# Patient Record
Sex: MALE | Race: WHITE | NOT HISPANIC OR LATINO | Employment: FULL TIME
[De-identification: names, ages, dates, MRNs, and addresses within clinical notes are randomized per-mention and may not be internally consistent; named-entity substitution may affect disease eponyms.]

---

## 2020-03-01 ENCOUNTER — HEALTH MAINTENANCE LETTER (OUTPATIENT)
Age: 45
End: 2020-03-01

## 2020-12-14 ENCOUNTER — HEALTH MAINTENANCE LETTER (OUTPATIENT)
Age: 45
End: 2020-12-14

## 2021-01-22 ENCOUNTER — HOSPITAL ENCOUNTER (EMERGENCY)
Facility: CLINIC | Age: 46
Discharge: HOME | End: 2021-01-22
Attending: PHYSICIAN ASSISTANT | Admitting: PHYSICIAN ASSISTANT
Payer: COMMERCIAL

## 2021-01-22 ENCOUNTER — APPOINTMENT (OUTPATIENT)
Dept: CT IMAGING | Facility: CLINIC | Age: 46
End: 2021-01-22
Attending: PHYSICIAN ASSISTANT
Payer: COMMERCIAL

## 2021-01-22 VITALS
DIASTOLIC BLOOD PRESSURE: 85 MMHG | BODY MASS INDEX: 25.07 KG/M2 | WEIGHT: 190 LBS | RESPIRATION RATE: 18 BRPM | HEART RATE: 78 BPM | SYSTOLIC BLOOD PRESSURE: 137 MMHG | OXYGEN SATURATION: 100 % | TEMPERATURE: 97.4 F

## 2021-01-22 DIAGNOSIS — N20.1 URETEROLITHIASIS: ICD-10-CM

## 2021-01-22 LAB
ALBUMIN UR-MCNC: 30 MG/DL
ANION GAP SERPL CALCULATED.3IONS-SCNC: 4 MMOL/L (ref 3–14)
APPEARANCE UR: CLEAR
BILIRUB UR QL STRIP: NEGATIVE
BUN SERPL-MCNC: 15 MG/DL (ref 7–30)
CALCIUM SERPL-MCNC: 9.5 MG/DL (ref 8.5–10.1)
CAOX CRY #/AREA URNS HPF: ABNORMAL /HPF
CHLORIDE SERPL-SCNC: 105 MMOL/L (ref 94–109)
CO2 SERPL-SCNC: 29 MMOL/L (ref 20–32)
COLOR UR AUTO: YELLOW
CREAT SERPL-MCNC: 0.96 MG/DL (ref 0.66–1.25)
GFR SERPL CREATININE-BSD FRML MDRD: >90 ML/MIN/{1.73_M2}
GLUCOSE SERPL-MCNC: 140 MG/DL (ref 70–99)
GLUCOSE UR STRIP-MCNC: NEGATIVE MG/DL
HGB UR QL STRIP: ABNORMAL
KETONES UR STRIP-MCNC: 10 MG/DL
LEUKOCYTE ESTERASE UR QL STRIP: NEGATIVE
MUCOUS THREADS #/AREA URNS LPF: PRESENT /LPF
NITRATE UR QL: NEGATIVE
PH UR STRIP: 5 PH (ref 5–7)
POTASSIUM SERPL-SCNC: 3.7 MMOL/L (ref 3.4–5.3)
RBC #/AREA URNS AUTO: 76 /HPF (ref 0–2)
SODIUM SERPL-SCNC: 138 MMOL/L (ref 133–144)
SOURCE: ABNORMAL
SP GR UR STRIP: 1.03 (ref 1–1.03)
UROBILINOGEN UR STRIP-MCNC: 2 MG/DL (ref 0–2)
WBC #/AREA URNS AUTO: 0 /HPF (ref 0–5)

## 2021-01-22 PROCEDURE — 96374 THER/PROPH/DIAG INJ IV PUSH: CPT

## 2021-01-22 PROCEDURE — 250N000011 HC RX IP 250 OP 636: Performed by: PHYSICIAN ASSISTANT

## 2021-01-22 PROCEDURE — 96376 TX/PRO/DX INJ SAME DRUG ADON: CPT

## 2021-01-22 PROCEDURE — 81001 URINALYSIS AUTO W/SCOPE: CPT | Performed by: PHYSICIAN ASSISTANT

## 2021-01-22 PROCEDURE — 80048 BASIC METABOLIC PNL TOTAL CA: CPT | Performed by: PHYSICIAN ASSISTANT

## 2021-01-22 PROCEDURE — 99285 EMERGENCY DEPT VISIT HI MDM: CPT | Mod: 25

## 2021-01-22 PROCEDURE — 96361 HYDRATE IV INFUSION ADD-ON: CPT

## 2021-01-22 PROCEDURE — 96375 TX/PRO/DX INJ NEW DRUG ADDON: CPT

## 2021-01-22 PROCEDURE — 258N000003 HC RX IP 258 OP 636: Performed by: PHYSICIAN ASSISTANT

## 2021-01-22 PROCEDURE — 74176 CT ABD & PELVIS W/O CONTRAST: CPT

## 2021-01-22 RX ORDER — HYDROMORPHONE HYDROCHLORIDE 1 MG/ML
1 INJECTION, SOLUTION INTRAMUSCULAR; INTRAVENOUS; SUBCUTANEOUS ONCE
Status: COMPLETED | OUTPATIENT
Start: 2021-01-22 | End: 2021-01-22

## 2021-01-22 RX ORDER — HYDROCODONE BITARTRATE AND ACETAMINOPHEN 5; 325 MG/1; MG/1
1 TABLET ORAL EVERY 6 HOURS PRN
Qty: 8 TABLET | Refills: 0 | Status: SHIPPED | OUTPATIENT
Start: 2021-01-22 | End: 2021-01-25

## 2021-01-22 RX ORDER — ONDANSETRON 2 MG/ML
4 INJECTION INTRAMUSCULAR; INTRAVENOUS ONCE
Status: COMPLETED | OUTPATIENT
Start: 2021-01-22 | End: 2021-01-22

## 2021-01-22 RX ORDER — ONDANSETRON 4 MG/1
4 TABLET, ORALLY DISINTEGRATING ORAL EVERY 8 HOURS PRN
Qty: 10 TABLET | Refills: 0 | Status: SHIPPED | OUTPATIENT
Start: 2021-01-22 | End: 2023-01-31

## 2021-01-22 RX ORDER — TAMSULOSIN HYDROCHLORIDE 0.4 MG/1
0.4 CAPSULE ORAL DAILY
Qty: 10 CAPSULE | Refills: 0 | Status: SHIPPED | OUTPATIENT
Start: 2021-01-22 | End: 2021-02-01

## 2021-01-22 RX ORDER — KETOROLAC TROMETHAMINE 15 MG/ML
15 INJECTION, SOLUTION INTRAMUSCULAR; INTRAVENOUS ONCE
Status: COMPLETED | OUTPATIENT
Start: 2021-01-22 | End: 2021-01-22

## 2021-01-22 RX ORDER — HYDROMORPHONE HYDROCHLORIDE 1 MG/ML
0.5 INJECTION, SOLUTION INTRAMUSCULAR; INTRAVENOUS; SUBCUTANEOUS ONCE
Status: COMPLETED | OUTPATIENT
Start: 2021-01-22 | End: 2021-01-22

## 2021-01-22 RX ADMIN — HYDROMORPHONE HYDROCHLORIDE 0.5 MG: 1 INJECTION, SOLUTION INTRAMUSCULAR; INTRAVENOUS; SUBCUTANEOUS at 12:15

## 2021-01-22 RX ADMIN — HYDROMORPHONE HYDROCHLORIDE 1 MG: 1 INJECTION, SOLUTION INTRAMUSCULAR; INTRAVENOUS; SUBCUTANEOUS at 12:48

## 2021-01-22 RX ADMIN — ONDANSETRON 4 MG: 2 INJECTION INTRAMUSCULAR; INTRAVENOUS at 12:15

## 2021-01-22 RX ADMIN — SODIUM CHLORIDE 1000 ML: 9 INJECTION, SOLUTION INTRAVENOUS at 12:15

## 2021-01-22 RX ADMIN — KETOROLAC TROMETHAMINE 15 MG: 15 INJECTION, SOLUTION INTRAMUSCULAR; INTRAVENOUS at 12:47

## 2021-01-22 ASSESSMENT — ENCOUNTER SYMPTOMS
ABDOMINAL PAIN: 1
VOMITING: 0
DYSURIA: 0
CHILLS: 0
NAUSEA: 0
HEMATURIA: 0
FREQUENCY: 0
SHORTNESS OF BREATH: 0
FEVER: 0
PALPITATIONS: 0

## 2021-01-22 NOTE — ED TRIAGE NOTES
"L flank pain started at 945 this AM. Started suddenly, cannot find any relief. Feels \"like I need to stand up and keep moving\"  "

## 2021-01-22 NOTE — ED PROVIDER NOTES
History   Chief Complaint:  Flank Pain    HPI   Darci Mckeon is a 45 year old male, who presents to the ED for evaluation of flank pain. The patient reports he had the sudden onset of left flank pain beginning at 0930 (three hours ago) this morning. He states the pain has progressed through the morning and is now wrapping into the left side of his abdomen causing some abdominal cramping and pain as well. He has the urgency to urinate, but has not noticed any dysuria or hematuria. He possibly saw some discharge, but he has no concern for STD as he is in a monogamous marriage. He denies any testicular or scrotal pain. He denies any chest pain, shortness of breath, or palpitations. He has not had a kidney stone in the past. The patient denies any fever, chills, nausea, emesis, or any other acute symptoms.     Allergies:  Chromium     Medications:    Vitamin D deficiency    Past Medical History:    Vitamin D deficiency    Past Surgical History:    The patient denies past surgical history.     Family History:    The patient denies past family history.     Social History:  Smoking status: Former  Alcohol use: Yes  PCP: Neeraj Escobedo  Presents to the ED alone  Marital Status:   [2]     Review of Systems   Constitutional: Negative for chills and fever.   Respiratory: Negative for shortness of breath.    Cardiovascular: Negative for chest pain and palpitations.   Gastrointestinal: Positive for abdominal pain. Negative for nausea and vomiting.   Genitourinary: Positive for discharge and urgency. Negative for dysuria, frequency, hematuria, scrotal swelling and testicular pain.   All other systems reviewed and are negative.    Physical Exam     Patient Vitals for the past 24 hrs:   BP Temp Temp src Pulse Resp SpO2 Weight   01/22/21 1309 -- -- -- -- -- 100 % --   01/22/21 1308 137/85 -- -- 78 -- -- --   01/22/21 1157 (!) 142/82 97.4  F (36.3  C) Oral 78 18 99 % 86.2 kg (190 lb)       Physical Exam  General:  Alert and interactive. Appears uncomfortable.   Eyes: The pupils are equal and round. EOMs intact. No scleral icterus.  ENT: No abnormalities to the external nose or ears. Mucous membranes moist. Posterior oropharynx is non-erythematous.      Neck: Trachea is in the midline. No nuchal rigidity.     CV: Regular rate and rhythm. S1 and S2 normal without murmur, click, gallop or rub.   Resp: Breath sounds are clear bilaterally, without rhonchi, wheezes, rales. Non-labored, no retractions or accessory muscle use.     GI: Abdomen is soft without distension. Left CVA tenderness. LLQ abdominal TTP.  MS: Moving all extremities well. Good muscle tone.   Skin: Warm and dry. No rash or lesions noted.  Neuro: Alert and oriented x 3. No focal neurologic deficits. Good strength and sensation in upper and lower extremities.    Psych: Awake. Alert.  Normal affect. Appropriate interactions.  Lymph: No anterior or posterior cervical lymphadenopathy noted.    Emergency Department Course   Imaging:    CT Abd/pelvis without contrast:  Left ureterovesical junction stone measures 0.4 cm.   Associated mild left hydronephrosis and left renal edema.     Imaging independently reviewed and agree with radiologist interpretation.     Laboratory:    BMP:  (H), o/w WNL (Creatinine 0.96)    UA: Ketone 10, Blood Moderate, Protein Albumin 30, RBC/HPF 76 (H), Mucous Present, Calcium Oxalate Few, o/w Negative     Interventions:  1215: NS 1L IV Bolus   1215: Zofran 4 mg IV  1215: Dilaudid 0.5 mg IV  1247: Toradol 15 mg IV  1248: Dilaudid 1 mg IV    Emergency Department Course:  Reviewed:  I reviewed the patient's nursing notes, vitals, and available past medical records.     Assessments:  1232: I assessed the patient and performed an exam as detailed above.    1357: I rechecked the patient. Explained findings to patient. The patient feels markedly improved and would like to be discharged.    Disposition:  The patient was discharged to home.      Impression & Plan   Medical Decision Making:  Darci Mckeon is a 45 year old male who presented with unilateral flank & abdominal pain consistent with renal colic. CT confirms a 4 mm ureteral stone at the left UVJ. Renal function is normal/baseline. CT and lab workup show no other alternative etiology that could be causing his symptoms (e.g., AAA, appendicitis, pyelonephritis). There is no fever or convincing evidence of a urinary tract infection. On recheck, his pain is controlled with interventions in the ED and he is tolerating POs. I will prescribe supportive medications and Flomax to facilitate stone passage. I have advised him to return for uncontrolled pain, vomiting, fever, or any other concerning symptoms. Urology follow up information provided.     Diagnosis:    ICD-10-CM    1. Ureterolithiasis  N20.1     left UVJ       Disposition:  Discharged to home.    Discharge Medications:  New Prescriptions    HYDROCODONE-ACETAMINOPHEN (NORCO) 5-325 MG TABLET    Take 1 tablet by mouth every 6 hours as needed for severe pain    ONDANSETRON (ZOFRAN ODT) 4 MG ODT TAB    Take 1 tablet (4 mg) by mouth every 8 hours as needed for nausea    TAMSULOSIN (FLOMAX) 0.4 MG CAPSULE    Take 1 capsule (0.4 mg) by mouth daily for 10 doses       Scribe Disclosure:  I, Yuniel Archer, am serving as a scribe at 12:32 PM on 1/22/2021 to document services personally performed by Tammy Xiong APC based on my observations and the provider's statements to me.        Tammy Xiong PA-C  01/22/21 [id number]

## 2021-04-17 ENCOUNTER — HEALTH MAINTENANCE LETTER (OUTPATIENT)
Age: 46
End: 2021-04-17

## 2021-10-02 ENCOUNTER — HEALTH MAINTENANCE LETTER (OUTPATIENT)
Age: 46
End: 2021-10-02

## 2022-02-15 NOTE — DISCHARGE INSTRUCTIONS
Discharge Instructions  Kidney Stones    Kidney stones are a common problem that can cause a lot of pain but fortunately are usually not dangerous. Kidney stones form in the kidney and then can cause a blockage (obstruction) of the flow of urine from the kidney which leads to pain. Most patients can manage kidney stones at home (without a hospital stay).  However, sometimes your condition may be worse than it seemed at first, or may get worse with time. Most kidney stones will pass on their own, but occasionally stones may need to be removed by an urologist.    Generally, every Emergency Department visit should have a follow-up clinic visit with either a primary or a specialty clinic/provider. Please follow-up as instructed by your emergency provider today.      Return to the Emergency Department if:  Your pain is not controlled despite the medications provided or recommended.  You are vomiting (throwing up) and cannot keep fluids or medications down.  You develop a fever (>100.4 F).  You feel much more ill or develop new symptoms.  What can I do to help myself?  Be sure to drink plenty of fluids.  If instructed to do so, strain your urine (pee) with the urine strainer you were provided with today. Your stone may look like a grain of sand or a small pebble. Collect any stones in the cup provided and bring to your follow-up appointment.  Staying active is good, and may help the stone to pass. You may do whatever you feel up to doing without restrictions.   Treatment:  Non-steroidal anti-inflammatory drugs (NSAIDs). This includes prescription medicines like Toradol  (ketorolac) and non-prescription medicines like Advil  (ibuprofen) and Nuprin  (ibuprofen) and Naproxen. These pain relievers are very effective for kidney stones.  Nausea (sick to your stomach) medication.  Nausea and vomiting are common with kidney stones, so your provider may send you home with medicine for this.   Flomax  (tamsulosin). This medicine is  GIULIANA Palma will be coming in for [facility] today regarding a cold sore outbreak. She is pregnant and wants to make sure the medication she gets prescribed will be ok with the pregnancy. sometimes used for men with prostate problems, but also can help kidney stones to pass. Its effectiveness is controversial or questionable so it is prescribed in certain situations. This medicine can lower blood pressure, and you may feel faint/lightheaded, especially when you first stand up. Be sure to get up gradually, sit down if you feel faint, and avoid activity where feeling faint would be dangerous, such as climbing ladders.  If you were given a prescription for medicine here today, be sure to read all of the information (including the package insert) that comes with your prescription.  This will include important information about the medicine, its side effects, and any warnings that you need to know about.  The pharmacist who fills the prescription can provide more information and answer questions you may have about the medicine.  If you have questions or concerns that the pharmacist cannot address, please call or return to the Emergency Department.   Remember that you can always come back to the Emergency Department if you are not able to see your regular provider in the amount of time listed above, if you get any new symptoms, or if there is anything that worries you.

## 2022-05-14 ENCOUNTER — HEALTH MAINTENANCE LETTER (OUTPATIENT)
Age: 47
End: 2022-05-14

## 2022-09-03 ENCOUNTER — HEALTH MAINTENANCE LETTER (OUTPATIENT)
Age: 47
End: 2022-09-03

## 2023-01-31 ENCOUNTER — OFFICE VISIT (OUTPATIENT)
Dept: FAMILY MEDICINE | Facility: CLINIC | Age: 48
End: 2023-01-31

## 2023-01-31 VITALS
BODY MASS INDEX: 26.63 KG/M2 | HEIGHT: 72 IN | DIASTOLIC BLOOD PRESSURE: 96 MMHG | WEIGHT: 196.6 LBS | SYSTOLIC BLOOD PRESSURE: 150 MMHG | OXYGEN SATURATION: 97 % | HEART RATE: 80 BPM

## 2023-01-31 DIAGNOSIS — Z12.11 SCREEN FOR COLON CANCER: ICD-10-CM

## 2023-01-31 DIAGNOSIS — I10 PRIMARY HYPERTENSION: ICD-10-CM

## 2023-01-31 DIAGNOSIS — Z13.6 ENCOUNTER FOR SCREENING FOR CARDIOVASCULAR DISORDERS: ICD-10-CM

## 2023-01-31 DIAGNOSIS — K21.9 GASTROESOPHAGEAL REFLUX DISEASE, UNSPECIFIED WHETHER ESOPHAGITIS PRESENT: ICD-10-CM

## 2023-01-31 DIAGNOSIS — R10.11 RUQ ABDOMINAL PAIN: ICD-10-CM

## 2023-01-31 DIAGNOSIS — Z00.00 ROUTINE GENERAL MEDICAL EXAMINATION AT A HEALTH CARE FACILITY: Primary | ICD-10-CM

## 2023-01-31 LAB
% GRANULOCYTES: 77.8 % (ref 42.2–75.2)
CHOL/HDL RATIO (RMG): 6.3 MG/DL (ref 0–4.5)
CHOLESTEROL: 230 MG/DL (ref 100–199)
HCT VFR BLD AUTO: 42.2 % (ref 39–51)
HDL (RMG): 37 MG/DL (ref 40–?)
HEMOGLOBIN: 14.8 G/DL (ref 13.4–17.5)
LDL CALCULATED (RMG): 139 MG/DL (ref 0–130)
LYMPHOCYTES NFR BLD AUTO: 17.7 % (ref 20.5–51.1)
MCH RBC QN AUTO: 30.7 PG (ref 27–31)
MCHC RBC AUTO-ENTMCNC: 35.1 G/DL (ref 33–37)
MCV RBC AUTO: 87.4 FL (ref 80–100)
MONOCYTES NFR BLD AUTO: 4.5 % (ref 1.7–9.3)
PLATELET # BLD AUTO: 260 K/UL (ref 140–450)
RBC # BLD AUTO: 4.82 X10/CMM (ref 4.2–5.9)
TRIGLYCERIDES (RMG): 270 MG/DL (ref 0–149)
WBC # BLD AUTO: 5.4 X10/CMM (ref 3.8–11)

## 2023-01-31 PROCEDURE — 90471 IMMUNIZATION ADMIN: CPT | Mod: 59 | Performed by: FAMILY MEDICINE

## 2023-01-31 PROCEDURE — 90674 CCIIV4 VAC NO PRSV 0.5 ML IM: CPT | Performed by: FAMILY MEDICINE

## 2023-01-31 PROCEDURE — 36415 COLL VENOUS BLD VENIPUNCTURE: CPT | Performed by: FAMILY MEDICINE

## 2023-01-31 PROCEDURE — 99386 PREV VISIT NEW AGE 40-64: CPT | Performed by: FAMILY MEDICINE

## 2023-01-31 PROCEDURE — 99214 OFFICE O/P EST MOD 30 MIN: CPT | Mod: 25 | Performed by: FAMILY MEDICINE

## 2023-01-31 PROCEDURE — 80061 LIPID PANEL: CPT | Mod: QW | Performed by: FAMILY MEDICINE

## 2023-01-31 PROCEDURE — 85025 COMPLETE CBC W/AUTO DIFF WBC: CPT | Performed by: FAMILY MEDICINE

## 2023-01-31 RX ORDER — LOSARTAN POTASSIUM 25 MG/1
25 TABLET ORAL DAILY
Qty: 30 TABLET | Refills: 0 | Status: SHIPPED | OUTPATIENT
Start: 2023-01-31 | End: 2023-02-14

## 2023-01-31 NOTE — PROGRESS NOTES
"3  SUBJECTIVE:   CC: Darci Mckeon is an 47 year old male who presents for preventive health visit.     Patient has been advised of split billing requirements and indicates understanding: Yes     New patient.    GERD: taking omeprazole prn.  Works when he takes it.  No melena, nausea, vomiting.  No weight loss.    RUQ pain: not severe but worse after eating.  Persistent and worse lately.  No n/v.    HTN: has not been diagnosed before but has been \"borderline\" for a long time and more recent BP has been higher.  Feels anxious about it.  Cut out beer for a month but did not help.    Healthy Habits:    Do you get at least three servings of calcium containing foods daily (dairy, green leafy vegetables, etc.)? yes    Amount of exercise or daily activities, outside of work: 2 day(s) per week    Problems taking medications regularly No    Medication side effects: No    Have you had an eye exam in the past two years? yes    Do you see a dentist twice per year? yes    Do you have sleep apnea, excessive snoring or daytime drowsiness?no        -------------------------------------    Today's PHQ-2 Score:   PHQ-2 ( 1999 [employer]) 1/31/2023   Q1: Little interest or pleasure in doing things 1   Q2: Feeling down, depressed or hopeless 0   PHQ-2 Score 1     Abuse: Current or Past(Physical, Sexual or Emotional)- No  Do you feel safe in your environment? Yes    Have you ever done Advance Care Planning? (For example, a Health Directive, POLST, or a discussion with a medical provider or your loved ones about your wishes): No, advance care planning information given to patient to review.  Patient plans to discuss their wishes with loved ones or provider.      Social History     Tobacco Use     Smoking status: Former     Types: Cigarettes     Quit date: 5/28/2012     Years since quitting: 10.6     Smokeless tobacco: Not on file   Substance Use Topics     Alcohol use: Yes     If you drink alcohol do you typically have >3 drinks per day or " ">7 drinks per week? Yes - AUDIT SCORE:     No flowsheet data found.                      Last PSA: No results found for: PSA    Reviewed orders with patient. Reviewed health maintenance and updated orders accordingly - Yes  Lab work is in process    Reviewed and updated as needed this visit by clinical staff   Tobacco  Allergies  Meds  Problems  Med Hx  Surg Hx  Fam Hx          Reviewed and updated as needed this visit by Provider   Tobacco  Allergies  Meds  Problems  Med Hx  Surg Hx  Fam Hx             ROS:  CONSTITUTIONAL: NEGATIVE for fever, chills, change in weight  INTEGUMENTARY/SKIN: NEGATIVE for worrisome rashes, moles or lesions  EYES: NEGATIVE for vision changes or irritation  ENT: NEGATIVE for ear, mouth and throat problems  RESP: NEGATIVE for significant cough or SOB  CV: NEGATIVE for chest pain, palpitations or peripheral edema  GI: NEGATIVE for nausea, abdominal pain, heartburn, or change in bowel habits   male: negative for dysuria, hematuria, decreased urinary stream, erectile dysfunction, urethral discharge  MUSCULOSKELETAL: NEGATIVE for significant arthralgias or myalgia  NEURO: NEGATIVE for weakness, dizziness or paresthesias  PSYCHIATRIC: NEGATIVE for changes in mood or affect    OBJECTIVE:   BP (!) 150/96   Pulse 80   Ht 1.835 m (6' 0.25\")   Wt 89.2 kg (196 lb 9.6 oz)   SpO2 97%   BMI 26.48 kg/m    EXAM:  GENERAL: healthy, alert and no distress  EYES: Eyes grossly normal to inspection, PERRL and conjunctivae and sclerae normal  HENT: ear canals and TM's normal, nose and mouth without ulcers or lesions  NECK: no adenopathy, no asymmetry, masses, or scars and thyroid normal to palpation  RESP: lungs clear to auscultation - no rales, rhonchi or wheezes  CV: regular rate and rhythm, normal S1 S2, no S3 or S4, no murmur, click or rub, no peripheral edema and peripheral pulses strong  ABDOMEN: soft, nontender, no hepatosplenomegaly, no masses and bowel sounds normal  MS: no gross " musculoskeletal defects noted, no edema  SKIN: no suspicious lesions or rashes  NEURO: Normal strength and tone, mentation intact and speech normal  PSYCH: mentation appears normal, affect normal/bright    Diagnostic Test Results:  Labs reviewed in Epic  Results for orders placed or performed in visit on 01/31/23 (from the past 24 hour(s))   Lipid Profile ([facility])   Result Value Ref Range    CHOLESTEROL 230 (A) 100 - 199 mg/dl    HDL 37 (A) 40 mg/dl    TRIGLYCERIDES ([facility]) 270 (A) 0 - 149 mg/dl    LDL CALCULATED ([facility]) 139 (A) 0 - 130 mg/dl    CHOL/HDL RATIO ([facility]) 6.3 (A) 0.0 - 4.5 mg/dl   CBC with Diff/Plt ([facility] [place])   Result Value Ref Range    WBC x10/cmm 5.4 3.8 - 11.0 x10/cmm    % Lymphocytes 17.7 (A) 20.5 - 51.1 %    % Monocytes 4.5 1.7 - 9.3 %    % Granulocytes 77.8 (A) 42.2 - 75.2 %    RBC x10/cmm 4.82 4.2 - 5.9 x10/cmm    Hemoglobin 14.8 13.4 - 17.5 g/dl    Hematocrit 42.2 39 - 51 %    MCV 87.4 80 - 100 fL    MCH 30.7 27.0 - 31.0 pg    MCHC 35.1 33.0 - 37.0 g/dL    Platelet Count 260 140 - 450 K/uL       ASSESSMENT/PLAN:   Routine general medical examination at a health care facility  - discussed preventative guidelines, healthy diet, exercise and weight management    Gastroesophageal reflux disease, unspecified whether esophagitis present  The pathophysiology of reflux is discussed.  Anti-reflux measures such as raising the head of the bed, avoiding tight clothing or belts, avoiding eating late at night and not lying down shortly after mealtime and achieving weight loss are discussed. Avoid ASA, NSAID's, caffeine, peppermints, alcohol and tobacco. OTC H2 blockers and/or antacids are often very helpful for PRN use. However, for chronic or frequent bothersome symptoms, prescription strength H2 blockers or a trial of PPI's should be used. I've explained that although PPI's are extremely effective, there is concern about rare but serious complications with long term use (C.diff, pneumonia, and atypical hip fractures).  "Further recommendations: medication side effects fully discussed, labs studies are not necessary at this time but would consider h pylori stool testing in the immed future if this were to recur. he should alert me if there are persistent symptoms, dysphagia, weight loss or GI bleeding. Follow up as planned    RUQ abdominal pain  Obtain ultrasound to evaluate for possible biliary colic  - Radiology Referral; Future    Primary hypertension  Discussed in detail.    - reduce salt and alcohol  - elects to start medication  - recheck in 2 weeks with BMP  - diet and exercise  - Comp. Metabolic Panel (14) ([facility])  - losartan (COZAAR) 25 MG tablet; Take 1 tablet (25 mg) by mouth daily  - CBC with Diff/Plt ([facility])    Encounter for screening for cardiovascular disorders  - Lipid Profile ([facility])    Screen for colon cancer  - Colonoscopy Screening  Referral; Future      Patient has been advised of split billing requirements and indicates understanding: Yes  COUNSELING:  Reviewed preventive health counseling, as reflected in patient instructions       Regular exercise       Healthy diet/nutrition       Alcohol Use        Colorectal cancer screening    Estimated body mass index is 26.48 kg/m  as calculated from the following:    Height as of this encounter: 1.835 m (6' 0.25\").    Weight as of this encounter: 89.2 kg (196 lb 9.6 oz).    Weight management plan: Discussed healthy diet and exercise guidelines    He reports that he quit smoking about 10 years ago. His smoking use included cigarettes. He does not have any smokeless tobacco history on file.      Counseling Resources:  ATP IV Guidelines  Pooled Cohorts Equation Calculator  FRAX Risk Assessment  ICSI Preventive Guidelines  Dietary Guidelines for Americans, 2010  USDA's MyPlate  ASA Prophylaxis  Lung CA Screening    Jose Salgado MD  [facility]  "

## 2023-01-31 NOTE — LETTER
[facility] [address] [place]  [address]  Phone: [phone]    February 8, 2023      Darci Mckeon  [address]  [facility] [address]              Dear Darci,     Your blood counts (white blood cells, red blood cells and platelets) are all normal.     Your kidney function and electrolytes (blood salts) are normal.  If you were not fasting, your blood sugar is fine.  If you were fasting, we should consider doing a more accurate test called a hemoglobin A1c.     Your cholesterol levels are above the normal range.  Using a risk calculator that factors in your age, blood pressure, and cholesterol (as well as other factors), we are able to estimate the chance of a heart attack or stroke over the next 10 years.  Based on your information, your risk is estimated to be 7.5%. This is an estimate and is a generalized risk based on certain factors, rather than personalized.  Current guidelines recommend initiating medication when the risk exceeds 7.5% (so you are right on the line).  If you would like to proceed with medication, I can send a new prescription to help reduce your risk (Atorvastatin 20 mg).  If you are interested in obtaining more personalized information to help you make a decision, we could obtain a coronary artery calcium scan.  These are low dose CT scans with relatively minimal radiation exposure and cost approximately 100 dollars out of pocket.  More information about this test and why it may be helpful to obtain can be found here:       [email or web address]     If you would like to discuss this further I am happy to go over it on the phone or at your next follow up appointment.         It was very nice meeting you.  Please feel free to call or email with any questions.        Sincerely,     Jose Salgado MD          Results for orders placed or performed in visit on 01/31/23   Lipid Profile ([facility])     Status: Abnormal    Result Value Ref Range    CHOLESTEROL 230 (A) 100 - 199 mg/dl    HDL 37 (A) 40 mg/dl    TRIGLYCERIDES ([facility]) 270 (A) 0 - 149 mg/dl    LDL CALCULATED ([facility]) 139 (A) 0 - 130 mg/dl    CHOL/HDL RATIO ([facility]) 6.3 (A) 0.0 - 4.5 mg/dl   Comp. Metabolic Panel (14) ([facility])     Status: Abnormal   Result Value Ref Range    Glucose 108 (H) 70 - 99 mg/dL    Urea Nitrogen 12 6 - 24 mg/dL    Creatinine 0.85 0.76 - 1.27 mg/dL    eGFR  107 >59 mL/min/1.73    BUN/Creatinine Ratio 14 9 - 20    Sodium 142 134 - 144 mmol/L    Potassium 3.9 3.5 - 5.2 mmol/L    Chloride 102 96 - 106 mmol/L    Total CO2 25 20 - 29 mmol/L    Calcium 10.1 8.7 - 10.2 mg/dL    Protein Total 6.7 6.0 - 8.5 g/dL    Albumin 4.5 4.0 - 5.0 g/dL    Globulin, Total 2.2 1.5 - 4.5 g/dL    A/G Ratio 2.0 1.2 - 2.2    Bilirubin Total 0.4 0.0 - 1.2 mg/dL    Alkaline Phosphatase 75 44 - 121 IU/L    AST 16 0 - 40 IU/L    ALT 19 0 - 44 IU/L    Narrative    Performed at:  01 - [facility] [address], [place], [place]  [id number]  : Arthur Hendricks MD, Phone:  [phone]   CBC with Diff/Plt ([facility])     Status: Abnormal   Result Value Ref Range    WBC x10/cmm 5.4 3.8 - 11.0 x10/cmm    % Lymphocytes 17.7 (A) 20.5 - 51.1 %    % Monocytes 4.5 1.7 - 9.3 %    % Granulocytes 77.8 (A) 42.2 - 75.2 %    RBC x10/cmm 4.82 4.2 - 5.9 x10/cmm    Hemoglobin 14.8 13.4 - 17.5 g/dl    Hematocrit 42.2 39 - 51 %    MCV 87.4 80 - 100 fL    MCH 30.7 27.0 - 31.0 pg    MCHC 35.1 33.0 - 37.0 g/dL    Platelet Count 260 140 - 450 K/uL

## 2023-02-01 LAB
ALBUMIN SERPL-MCNC: 4.5 G/DL (ref 4–5)
ALBUMIN/GLOB SERPL: 2 {RATIO} (ref 1.2–2.2)
ALP SERPL-CCNC: 75 IU/L (ref 44–121)
ALT SERPL-CCNC: 19 IU/L (ref 0–44)
AST SERPL-CCNC: 16 IU/L (ref 0–40)
BILIRUB SERPL-MCNC: 0.4 MG/DL (ref 0–1.2)
BUN SERPL-MCNC: 12 MG/DL (ref 6–24)
BUN/CREATININE RATIO: 14 (ref 9–20)
CALCIUM SERPL-MCNC: 10.1 MG/DL (ref 8.7–10.2)
CHLORIDE SERPLBLD-SCNC: 102 MMOL/L (ref 96–106)
CREAT SERPL-MCNC: 0.85 MG/DL (ref 0.76–1.27)
EGFR: 107 ML/MIN/1.73
GLOBULIN, TOTAL: 2.2 G/DL (ref 1.5–4.5)
GLUCOSE SERPL-MCNC: 108 MG/DL (ref 70–99)
POTASSIUM SERPL-SCNC: 3.9 MMOL/L (ref 3.5–5.2)
PROT SERPL-MCNC: 6.7 G/DL (ref 6–8.5)
SODIUM SERPL-SCNC: 142 MMOL/L (ref 134–144)
TOTAL CO2: 25 MMOL/L (ref 20–29)

## 2023-02-03 ENCOUNTER — TRANSFERRED RECORDS (OUTPATIENT)
Dept: FAMILY MEDICINE | Facility: CLINIC | Age: 48
End: 2023-02-03

## 2023-02-07 ENCOUNTER — TELEPHONE (OUTPATIENT)
Dept: FAMILY MEDICINE | Facility: CLINIC | Age: 48
End: 2023-02-07

## 2023-02-07 NOTE — TELEPHONE ENCOUNTER
----- Message from Jose Salgado MD sent at 2/6/2023 12:07 PM CST -----  Please call patient with results:    Ultrasound was 100% normal.  This evaluates the liver and gallbladder.  If symptoms persist would recommend follow up.    Jose Salgado MD

## 2023-02-07 NOTE — TELEPHONE ENCOUNTER
Called and spoke with patient regarding US results per Dr Salgado. Patient reports that he does continue to have some discomfort and he is scheduled to f/u with Dr Salgado 2/14/23. Ellie Parekh

## 2023-02-14 ENCOUNTER — OFFICE VISIT (OUTPATIENT)
Dept: FAMILY MEDICINE | Facility: CLINIC | Age: 48
End: 2023-02-14

## 2023-02-14 VITALS
WEIGHT: 196 LBS | OXYGEN SATURATION: 96 % | DIASTOLIC BLOOD PRESSURE: 90 MMHG | HEART RATE: 74 BPM | SYSTOLIC BLOOD PRESSURE: 138 MMHG | BODY MASS INDEX: 26.4 KG/M2

## 2023-02-14 DIAGNOSIS — R10.11 RUQ ABDOMINAL PAIN: ICD-10-CM

## 2023-02-14 DIAGNOSIS — E78.5 DYSLIPIDEMIA: ICD-10-CM

## 2023-02-14 DIAGNOSIS — I10 PRIMARY HYPERTENSION: Primary | ICD-10-CM

## 2023-02-14 PROCEDURE — 36415 COLL VENOUS BLD VENIPUNCTURE: CPT | Performed by: FAMILY MEDICINE

## 2023-02-14 PROCEDURE — 99214 OFFICE O/P EST MOD 30 MIN: CPT | Performed by: FAMILY MEDICINE

## 2023-02-14 RX ORDER — LOSARTAN POTASSIUM 50 MG/1
50 TABLET ORAL DAILY
Qty: 90 TABLET | Refills: 0
Start: 2023-02-14 | End: 2023-02-20

## 2023-02-14 NOTE — PROGRESS NOTES
Subjective     Darci is a 47 year old patient who presents to clinic for follow up.    HTN: on losartan 25 mg.  Not checking readings at home.  No side effects.    RUQ pain: mild but persistent.  Worse after eating.  Normal ultrasound.  Has colonoscopy scheduled    HLD: ASCVD risk 7.5%.  No symptoms.        Review of Systems   Constitutional, HEENT, cardiovascular, pulmonary, GI, , musculoskeletal, neuro, skin, endocrine and psych systems are negative, except as otherwise noted.      Objective    BP (!) 138/90   Pulse 74   Wt 88.9 kg (196 lb)   SpO2 96%   BMI 26.40 kg/m      General: Well appearing, NAD  Psych: normal mood and affect        No results found for this or any previous visit (from the past 24 hour(s)).    Primary hypertension  Remains slightly above goal, increase to 50 mg and recheck in 2-3 weeks  - Basic Metabolic Panel (8) ([facility])  - losartan (COZAAR) 50 MG tablet; Take 1 tablet (50 mg) by mouth daily    RUQ abdominal pain  Given duration would recommend CT.  He would like to wait until after colonoscopy    Dyslipidemia  Discussed options.  Will proceed with CAC scan  - CT Calcium Screening; Future    Follow up in 2-3 weeks

## 2023-02-15 LAB
BUN SERPL-MCNC: 13 MG/DL (ref 6–24)
BUN/CREATININE RATIO: 18 (ref 9–20)
CALCIUM SERPL-MCNC: 9.5 MG/DL (ref 8.7–10.2)
CHLORIDE SERPLBLD-SCNC: 105 MMOL/L (ref 96–106)
CREAT SERPL-MCNC: 0.74 MG/DL (ref 0.76–1.27)
EGFR: 112 ML/MIN/1.73
GLUCOSE SERPL-MCNC: 95 MG/DL (ref 70–99)
POTASSIUM SERPL-SCNC: 4.2 MMOL/L (ref 3.5–5.2)
SODIUM SERPL-SCNC: 141 MMOL/L (ref 134–144)
TOTAL CO2: 24 MMOL/L (ref 20–29)

## 2023-02-20 DIAGNOSIS — I10 PRIMARY HYPERTENSION: ICD-10-CM

## 2023-02-20 RX ORDER — LOSARTAN POTASSIUM 50 MG/1
50 TABLET ORAL DAILY
Qty: 90 TABLET | Refills: 0 | Status: SHIPPED | OUTPATIENT
Start: 2023-02-20 | End: 2023-05-25

## 2023-03-01 ENCOUNTER — OFFICE VISIT (OUTPATIENT)
Dept: FAMILY MEDICINE | Facility: CLINIC | Age: 48
End: 2023-03-01

## 2023-03-01 VITALS
WEIGHT: 196 LBS | DIASTOLIC BLOOD PRESSURE: 86 MMHG | HEART RATE: 81 BPM | BODY MASS INDEX: 26.4 KG/M2 | OXYGEN SATURATION: 97 % | SYSTOLIC BLOOD PRESSURE: 131 MMHG

## 2023-03-01 DIAGNOSIS — I10 PRIMARY HYPERTENSION: Primary | ICD-10-CM

## 2023-03-01 DIAGNOSIS — Z23 ENCOUNTER FOR IMMUNIZATION: ICD-10-CM

## 2023-03-01 PROCEDURE — 36415 COLL VENOUS BLD VENIPUNCTURE: CPT | Performed by: FAMILY MEDICINE

## 2023-03-01 PROCEDURE — 99213 OFFICE O/P EST LOW 20 MIN: CPT | Performed by: FAMILY MEDICINE

## 2023-03-01 NOTE — PROGRESS NOTES
Jason Bejarano is a 47 year old patient who presents to clinic for follow up.    HTN: increased losartan to 50 mg. Doing well.  No s/e.    RUQ pain: mild but persistent.  Worse after eating.  Normal ultrasound.  Has colonoscopy scheduled     HLD: ASCVD risk 7.5%. Has CAC scan scheduled        Review of Systems   Constitutional, HEENT, cardiovascular, pulmonary, GI, , musculoskeletal, neuro, skin, endocrine and psych systems are negative, except as otherwise noted.      Objective    /86   Pulse 81   Wt 88.9 kg (196 lb)   SpO2 97%   BMI 26.40 kg/m      General: Well appearing, NAD  Psych: normal mood and affect        No results found for this or any previous visit (from the past 24 hour(s)).    Primary hypertension  At goal, cont losartan 50 mg.  Check BMP  - Basic Metabolic Panel (8) ([facility])  - VENOUS COLLECTION    Encounter for immunization  - VACCINE ADMINISTRATION, INITIAL

## 2023-03-02 LAB
BUN SERPL-MCNC: 14 MG/DL (ref 6–24)
BUN/CREATININE RATIO: 19 (ref 9–20)
CALCIUM SERPL-MCNC: 10 MG/DL (ref 8.7–10.2)
CHLORIDE SERPLBLD-SCNC: 103 MMOL/L (ref 96–106)
CREAT SERPL-MCNC: 0.73 MG/DL (ref 0.76–1.27)
EGFR: 112 ML/MIN/1.73
GLUCOSE SERPL-MCNC: 97 MG/DL (ref 70–99)
POTASSIUM SERPL-SCNC: 4.7 MMOL/L (ref 3.5–5.2)
SODIUM SERPL-SCNC: 142 MMOL/L (ref 134–144)
TOTAL CO2: 24 MMOL/L (ref 20–29)

## 2023-03-03 ENCOUNTER — HOSPITAL ENCOUNTER (OUTPATIENT)
Dept: CARDIOLOGY | Facility: CLINIC | Age: 48
Discharge: HOME | End: 2023-03-03
Attending: FAMILY MEDICINE | Admitting: FAMILY MEDICINE

## 2023-03-03 DIAGNOSIS — E78.5 DYSLIPIDEMIA: ICD-10-CM

## 2023-03-03 PROCEDURE — 75571 CT HRT W/O DYE W/CA TEST: CPT | Mod: 26 | Performed by: INTERNAL MEDICINE

## 2023-03-03 PROCEDURE — 75571 CT HRT W/O DYE W/CA TEST: CPT

## 2023-03-29 ENCOUNTER — TRANSFERRED RECORDS (OUTPATIENT)
Dept: FAMILY MEDICINE | Facility: CLINIC | Age: 48
End: 2023-03-29

## 2023-03-30 ENCOUNTER — DOCUMENTATION ONLY (OUTPATIENT)
Dept: GASTROENTEROLOGY | Facility: CLINIC | Age: 48
End: 2023-03-30

## 2023-03-30 NOTE — PROGRESS NOTES
Colorectal Cancer Screening Results  External result received from: [facility]   Date of Procedure: 3/29/2023     Health Maintenance updated based on provider recommendations/ASGE Guidelines.      Regine Leal RN on 3/30/2023 at 10:47 AM   IP Acute Physical Therapy Initial Evaluation  Plan of Care Note    BASELINE STATUS COMPARED WITH CURRENT STATUS: Patient presents below baseline which was independent with mobility. Prior living history obtained from patient (will need to confirm with family). Patient lives in 2 story house with nephew and daughter who works as a CNA. Has about 4 stairs to enter, 1 flight to 2nd floor. Patient reports she was not using any assistive device prior to this admission. PMH significant for hx of recurrent uterine cancer, CVA in 2015. Admitted with uncontrolled a fib while undergoing chemo, current CT shows L lower lobe lung nodule (possibly a metastatic lesion).     ASSESSMENT  Patient's overall level of function is mod/ max assist for bed mobility, noted urinary incontinence in bed. Transfers and 5 feet ambulation with hand hold assist of 1 and gait belt requiring mod assist due to unsteadiness and fatigue. Patient agreeable to try WW for mobility next session due to balance deficits and weakness noted at time of PT evaluation. Patient offers minimal verbalizations, low volume speech, and presents with increased fatigue with all mobility tasks. Will monitor mobility progress and make appropriate D/C recommendations. May need to set-up family teaching with daughter/ nephew and obtain assistive device-if plan if for home with home therapy. Otherwise, may need subacute rehab due to decreased mobility and safety with all mobility tasks.     Recommendations and Plan:  PT Identified Barriers to Discharge: fatigue, weakness, undergoing chemo for recurrent uterine CA  Recommendation for Discharge: PT: Home;Home therapy;Less intensive rehab (08/12/18 1100)    Treatment Plan for Next Session: encourage use of WW- transfers and ambulation, bed mobility, encourage out of bed     Frequency Comments: M-F, SA vs H/HT    Precautions:  Precautions  Other Precautions: admitted with a-fib, currently on chemotherapy for recurrent uterine CA  (18 1100)  Precautions Comments: hx of CVA in 2015 (18 1100)    AM-PAC Outcomes - Basic Mobility Domain  How much help from another person does the patient currently need? *  Task Score  Norm   1. Turning from back to side while in flat bed without use of bedrails? 2 - A Lot   2. Moving from lying on back to sittin - A Lot   3. Moving to and from a bed to a chair (including wheelchair) 2 - A Lot   4. Standing up from a chair using your arms 2 - A Lot   5. To walk in a hospital room? 2 - A Lot   6. Climbing 3-5 steps with a railing?  1 - Total   Raw Score:    Converted Score: 30.25 (Raw score = 11)   G code conversion CL: 60-79% impairment (Raw score: 9-12)     Converted score >42.9 predictive of discharge to home  *Score based on clinical judgment/expected performance, may not have been performed during this session  Scoring Guidelines  1) Patient may use assistive devices unless otherwise indicated in question  2) Do not consider help for management of medical devices only (IV poles, catheters, NG, etc) as part of assist level  3) If activity was not observed and patient unable to do the activity, select \"Total\" .  If the patient can do the activity but was not directly observed, use profession judgment to determine how much assistance would be needed.      Clinical Presentation: evolving clinical presentation with changing characteristics       Below is key objective and subjective information as of the date/time noted.  For further details and goals, please refer to the PT Assess/Treat/Goals flowsheet.    Diagnosis:  1. New onset atrial fibrillation (CMS/HCC)    2. Endometrial cancer (CMS/HCC)    3. Edema of both legs        Co-morbidities:   Patient Active Problem List   Diagnosis   • Essential hypertension, benign   • COPD (chronic obstructive pulmonary disease) (CMS/HCC)   • DM (diabetes mellitus screen)   • PMB (postmenopausal bleeding)   • Hepatitis C   • S/P dilation and curettage   •  Abnormal stress test   • Chest pain   • Pre-operative cardiovascular examination   • Abnormal liver enzymes   • Endometrial cancer (CMS/HCC)   • Atrial fibrillation with RVR (CMS/HCC)   • Elevated serum creatinine         Prior Living Situation:  Type of Home: House (08/12/18 1100)  Lives With: Family (daughter who is a CNA< nephew) (08/12/18 1100)    Bed Mobility:  Bed Mobility  Rolling to the Right: Moderate Assist (Mod) (08/12/18 1100)  Supine to Sit: Maximal Assist (Max) (08/12/18 1100)  Bed Mobility Comments: mod assist to roll to right with siderail, max assist of 1 supine to sit, max assist to scoot to edge of bed, posterior loss of balance when attempting to scoot forward (08/12/18 1100)    Transfers:  Transfers  Sit to Stand: Moderate Assist (Mod) (08/12/18 1100)  Stand to Sit: Moderate Assist (Mod) (08/12/18 1100)  Stand Pivot Transfers: Moderate Assist (Mod) (08/12/18 1100)  Assistive Device/: 1 Person;Gait Belt (08/12/18 1100)  Transfer Comments 1: mod assist of 1 with hand hold assist to complete transfers, total assist for perihygiene due to urinary incontinence in bed (08/12/18 1100)    Gait:  Gait  Gait Assistance: Moderate Assist (Mod) (08/12/18 1100)  Assistive Device/: 1 Person;Gait Belt (08/12/18 1100)  Ambulation Distance (Feet): 5 Feet (08/12/18 1100)  Gait Comments 1: mod assist hand hold assist of 1 to ambulate bed to recliner chair, very unsteady when stepping forward (08/12/18 1100)        Education:   On this date, the patient was educated on safety with transfers and ambulation .    The response to education was: Needs reinforcement.     Discussed with Patient the need for adequate help at home upon discharge.  Patient currently has sufficient help at their previous living situation.  Please see above for discharge recommendations.  Family/caregiver teaching is not appropriate based on discharge plan.    Equipment:  PT/OT Mobility Equipment for Discharge: pt.  denies using/ owning any cane/ WW; chart indicates WW was issued May 2015 (18)     Therapy Goals  Goals  Short Term Goals to Be Reviewed On: 18 (18)  Short Term Goals = Discharge Goals: Yes (18)  Goal Agreement: Patient agrees with goals and treatment plan (18)  Bed Mobility Discharge Goal: supervision (18)  Transfer Discharge Goal: supervision (18)  Ambulation Discharge Goal: supervision ambulation 150 feet with/ without AD (18)  Stairs Discharge Goal: supervision to manage 4 stairs, 1 flight stairs with rail (18)    Interventions and Treatment Time:  Treatment/Interventions: Functional transfer training;Equipment eval/education;Gait training;Stairs retraining;Safety Education (18)  PT Time Spent: 45 minutes (18)      Note sent for required physician co-signature: Yes         Is the patient currently receiving skilled home care services (RN or therapy) No                    Therapy Diagnosis: Weakness, Impaired gait    Amount: 31-60 minutes  Frequency:     Duration: 5 days    Billing Information:    Timed Procedures:   , ,$ Gait Trainin-22 mins   , , ,  , , $ Therapy Activities per 15 min: 8-22 mins   Untimed Procedures:   , , ,  , $ PT Eval: Moderate Complexity: 1 Procedure (18),  ,  , ,      Total Treatment Time:  PT Time Spent: 45 minutes   Timed Treatment Minutes:   25 minutes    The co-signature indicates that the physician certifies the need for PT furnished under this plan of treatment while under his/her care.

## 2023-05-24 DIAGNOSIS — I10 PRIMARY HYPERTENSION: ICD-10-CM

## 2023-05-26 RX ORDER — LOSARTAN POTASSIUM 50 MG/1
TABLET ORAL
Qty: 90 TABLET | Refills: 1 | Status: SHIPPED | OUTPATIENT
Start: 2023-05-26 | End: 2023-11-14

## 2023-11-14 DIAGNOSIS — I10 PRIMARY HYPERTENSION: ICD-10-CM

## 2023-11-14 RX ORDER — LOSARTAN POTASSIUM 50 MG/1
TABLET ORAL
Qty: 90 TABLET | Refills: 0 | Status: SHIPPED | OUTPATIENT
Start: 2023-11-14 | End: 2024-02-13

## 2024-01-21 NOTE — TELEPHONE ENCOUNTER
ERIK Cunningham symptoms of iritis and advised to call office to be seen if any symptoms.     Hyperopia  Astigmatism  Presbyopia  -New Rx given per patient request.   -May also continue OTC reading glasses as needed.       No history of intraocular surgery/refractive surgery.   No FH of AMD/glaucoma     Med: losartan (COZAAR) 50 MG tablet     LOV (related): 3/1/23    Last Lab: 3/1/23      Due for F/U around: not noted    Next Appt: none        BP Readings from Last 3 Encounters:   03/01/23 131/86   02/14/23 (!) 138/90   01/31/23 (!) 150/96       Last Comprehensive Metabolic Panel:  Lab Results   Component Value Date     03/01/2023    POTASSIUM 4.7 03/01/2023    CHLORIDE 103 03/01/2023    CO2 29 01/22/2021    ANIONGAP 4 01/22/2021    GLC 97 03/01/2023    BUN 14 03/01/2023    BUN 19 03/01/2023    CR 0.73 (L) 03/01/2023    GFRESTIMATED >90 01/22/2021    CINDY 10.0 03/01/2023

## 2024-02-10 DIAGNOSIS — I10 PRIMARY HYPERTENSION: ICD-10-CM

## 2024-02-13 RX ORDER — LOSARTAN POTASSIUM 50 MG/1
TABLET ORAL
Qty: 90 TABLET | Refills: 0 | Status: SHIPPED | OUTPATIENT
Start: 2024-02-13 | End: 2024-05-13

## 2024-02-13 NOTE — CONFIDENTIAL NOTE
Med: LOSARTAN    LOV (related): 3/1/23    Last Lab: 3/1/23      Due for F/U around: OVERDUE FOR CPX OR MED CHECK    Next Appt: NONE        BP Readings from Last 3 Encounters:   03/01/23 131/86   02/14/23 (!) 138/90   01/31/23 (!) 150/96       Last Comprehensive Metabolic Panel:  Lab Results   Component Value Date     03/01/2023    POTASSIUM 4.7 03/01/2023    CHLORIDE 103 03/01/2023    CO2 29 01/22/2021    ANIONGAP 4 01/22/2021    GLC 97 03/01/2023    BUN 14 03/01/2023    BUN 19 03/01/2023    CR 0.73 (L) 03/01/2023    GFRESTIMATED >90 01/22/2021    CINDY 10.0 03/01/2023

## 2024-04-27 ENCOUNTER — HEALTH MAINTENANCE LETTER (OUTPATIENT)
Age: 49
End: 2024-04-27

## 2024-05-11 DIAGNOSIS — I10 PRIMARY HYPERTENSION: ICD-10-CM

## 2024-05-13 RX ORDER — LOSARTAN POTASSIUM 50 MG/1
TABLET ORAL
Qty: 90 TABLET | Refills: 0 | Status: SHIPPED | OUTPATIENT
Start: 2024-05-13 | End: 2024-07-16

## 2024-07-16 ENCOUNTER — OFFICE VISIT (OUTPATIENT)
Dept: FAMILY MEDICINE | Facility: CLINIC | Age: 49
End: 2024-07-16

## 2024-07-16 VITALS
OXYGEN SATURATION: 98 % | HEIGHT: 73 IN | HEART RATE: 78 BPM | SYSTOLIC BLOOD PRESSURE: 145 MMHG | BODY MASS INDEX: 27.46 KG/M2 | WEIGHT: 207.2 LBS | DIASTOLIC BLOOD PRESSURE: 97 MMHG

## 2024-07-16 DIAGNOSIS — I10 PRIMARY HYPERTENSION: ICD-10-CM

## 2024-07-16 DIAGNOSIS — Z00.00 ROUTINE GENERAL MEDICAL EXAMINATION AT A HEALTH CARE FACILITY: Primary | ICD-10-CM

## 2024-07-16 DIAGNOSIS — Z13.1 SCREENING FOR DIABETES MELLITUS: ICD-10-CM

## 2024-07-16 DIAGNOSIS — Z11.59 NEED FOR HEPATITIS C SCREENING TEST: ICD-10-CM

## 2024-07-16 DIAGNOSIS — Z23 ENCOUNTER FOR IMMUNIZATION: ICD-10-CM

## 2024-07-16 DIAGNOSIS — E78.5 DYSLIPIDEMIA: ICD-10-CM

## 2024-07-16 DIAGNOSIS — K21.9 GASTROESOPHAGEAL REFLUX DISEASE, UNSPECIFIED WHETHER ESOPHAGITIS PRESENT: ICD-10-CM

## 2024-07-16 DIAGNOSIS — M25.551 HIP PAIN, RIGHT: ICD-10-CM

## 2024-07-16 DIAGNOSIS — R10.11 RUQ ABDOMINAL PAIN: ICD-10-CM

## 2024-07-16 LAB
CHOLESTEROL: 247 MG/DL (ref 100–199)
FASTING?: NO
HBA1C MFR BLD: 5.2 %
HDL (RMG): 43 MG/DL (ref 40–?)
LDL CALCULATED (RMG): 159 MG/DL (ref 0–130)
TRIGLYCERIDES (RMG): 224 MG/DL (ref 0–149)

## 2024-07-16 PROCEDURE — 86803 HEPATITIS C AB TEST: CPT | Performed by: FAMILY MEDICINE

## 2024-07-16 PROCEDURE — 99396 PREV VISIT EST AGE 40-64: CPT | Mod: 25 | Performed by: FAMILY MEDICINE

## 2024-07-16 PROCEDURE — 86364 TISS TRNSGLTMNASE EA IG CLAS: CPT | Performed by: FAMILY MEDICINE

## 2024-07-16 PROCEDURE — 83036 HEMOGLOBIN GLYCOSYLATED A1C: CPT | Performed by: FAMILY MEDICINE

## 2024-07-16 PROCEDURE — 80061 LIPID PANEL: CPT | Mod: QW | Performed by: FAMILY MEDICINE

## 2024-07-16 PROCEDURE — 83695 ASSAY OF LIPOPROTEIN(A): CPT | Performed by: FAMILY MEDICINE

## 2024-07-16 PROCEDURE — 86803 HEPATITIS C AB TEST: CPT | Mod: 90 | Performed by: FAMILY MEDICINE

## 2024-07-16 PROCEDURE — 73502 X-RAY EXAM HIP UNI 2-3 VIEWS: CPT | Mod: RT | Performed by: FAMILY MEDICINE

## 2024-07-16 PROCEDURE — 99213 OFFICE O/P EST LOW 20 MIN: CPT | Mod: 25 | Performed by: FAMILY MEDICINE

## 2024-07-16 PROCEDURE — 90715 TDAP VACCINE 7 YRS/> IM: CPT | Performed by: FAMILY MEDICINE

## 2024-07-16 PROCEDURE — 90472 IMMUNIZATION ADMIN EACH ADD: CPT | Performed by: FAMILY MEDICINE

## 2024-07-16 PROCEDURE — 90471 IMMUNIZATION ADMIN: CPT | Performed by: FAMILY MEDICINE

## 2024-07-16 PROCEDURE — 36415 COLL VENOUS BLD VENIPUNCTURE: CPT | Performed by: FAMILY MEDICINE

## 2024-07-16 PROCEDURE — 90750 HZV VACC RECOMBINANT IM: CPT | Performed by: FAMILY MEDICINE

## 2024-07-16 RX ORDER — LOSARTAN POTASSIUM 100 MG/1
100 TABLET ORAL DAILY
Qty: 90 TABLET | Refills: 0 | Status: SHIPPED | OUTPATIENT
Start: 2024-07-16

## 2024-07-16 SDOH — HEALTH STABILITY: PHYSICAL HEALTH: ON AVERAGE, HOW MANY DAYS PER WEEK DO YOU ENGAGE IN MODERATE TO STRENUOUS EXERCISE (LIKE A BRISK WALK)?: 3 DAYS

## 2024-07-16 SDOH — HEALTH STABILITY: PHYSICAL HEALTH: ON AVERAGE, HOW MANY MINUTES DO YOU ENGAGE IN EXERCISE AT THIS LEVEL?: 30 MIN

## 2024-07-16 ASSESSMENT — SOCIAL DETERMINANTS OF HEALTH (SDOH): HOW OFTEN DO YOU GET TOGETHER WITH FRIENDS OR RELATIVES?: TWICE A WEEK

## 2024-07-16 NOTE — PROGRESS NOTES
"Preventive Care Visit  [facility]  Jose Salgado MD, Family Medicine  Jul 16, 2024      Assessment & Plan     Routine general medical examination at a health care facility  - discussed preventative guidelines, healthy diet, exercise and weight management    Primary hypertension  Not at goal, asymptomatic  Increase to 100 mg and recheck in one month  Monitor home readings  - losartan (COZAAR) 100 MG tablet; Take 1 tablet (100 mg) by mouth daily    Dyslipidemia  recheck  - Lipid Profile ([facility])  - Lipoprotein (a); Future  - Lipoprotein (a)    RUQ abdominal pain  Uncertain cause.    Check labs  If normal and not improved with bowel cleanse will refer to GI  - Tissue transglutaminase vonnie IgA and IgG; Future  - Helicobacter pylori Antigen Stool; Future  - Calprotectin Feces; Future  - Tissue transglutaminase vonnie IgA and IgG    Hip pain, right  Xray  PT if normal  - XR Hip Right 2-3 Views; Future  - XR Hip Right 2-3 Views    Gastroesophageal reflux disease, unspecified whether esophagitis present  Increase to PPI BID and if not resolved will refer to GI  Rule out H pylori  - omeprazole (PRILOSEC) 20 MG DR capsule; Take 1 capsule (20 mg) by mouth 2 times daily for 60 days  - Helicobacter pylori Antigen Stool; Future    Encounter for immunization  - VACCINE ADMINISTRATION, INITIAL  - IMMUNIATION ADMIN EACH ADDT'    Need for hepatitis C screening test  - Hepatitis C Screen Reflex to HCV RNA Quant and Genotype; Future  - Hepatitis C Screen Reflex to HCV RNA Quant and Genotype    Screening for diabetes mellitus  - Hemoglobin A1c; Future  - Hemoglobin A1c    Patient has been advised of split billing requirements and indicates understanding: Yes        BMI  Estimated body mass index is 27.34 kg/m  as calculated from the following:    Height as of this encounter: 1.854 m (6' 1\").    Weight as of this encounter: 94 kg (207 lb 3.2 oz).   Weight management plan: Discussed healthy diet and exercise " guidelines    Counseling  Appropriate preventive services were discussed with this patient, including applicable screening as appropriate for fall prevention, nutrition, physical activity, Tobacco-use cessation, weight loss and cognition.  Checklist reviewing preventive services available has been given to the patient.  Reviewed patient's diet, addressing concerns and/or questions.   He is at risk for lack of exercise and has been provided with information to increase physical activity for the benefit of his well-being.       See Patient Instructions    Return in about 4 weeks (around 8/13/2024) for Follow Up.    Jason Bejarano is a 49 year old, presenting for the following:  Physical (Not fasting ), Immunization (Tdap- last 2/2013), Heartburn (Acid reflux- has been using OTC), and Abdominal Pain (General discomfort, middle R side /Colonoscopy done (normal), had US done )         Health Care Directive  Patient does not have a Health Care Directive or Living Will: Discussed advance care planning with patient; information given to patient to review.    HPI    HTN: on losartan 50.  Has not been checking at home.  No headache, CP, SOB, vision changes    HLD: calcium score of 0.  Not on medications    RUQ pain: persists.  Dull ache, intermittent, sometimes worse after eating.  US was normal.  Colonoscopy normal.  Reports often constipated.    GERD: mild improvement with omeprazole 20.  No melena or hematochezia.      Right groin/hip pain.  Chronic.  Worse with movement          7/16/2024   General Health   How would you rate your overall physical health? (!) FAIR   Feel stress (tense, anxious, or unable to sleep) Patient declined            7/16/2024   Nutrition   Three or more servings of calcium each day? Yes   Diet: Regular (no restrictions)   How many servings of fruit and vegetables per day? (!) 2-3   How many sweetened beverages each day? 0-1            7/16/2024   Exercise   Days per week of moderate/strenous  exercise 3 days   Average minutes spent exercising at this level 30 min            2024   Social Factors   Frequency of gathering with friends or relatives Twice a week   Worry food won't last until get money to buy more Patient declined   Food not last or not have enough money for food? Patient declined   Do you have housing? (Housing is defined as stable permanent housing and does not include staying ouside in a car, in a tent, in an abandoned building, in an overnight shelter, or couch-surfing.) Patient declined   Are you worried about losing your housing? Patient declined   Lack of transportation? Patient declined   Unable to get utilities (heat,electricity)? Patient declined            2024   Fall Risk   Fallen 2 or more times in the past year? No   Trouble with walking or balance? No             2024   Dental   Dentist two times every year? Yes            2024   TB Screening   Were you born outside of the [place]? Decline      Today's PHQ-2 Score:       2024     1:29 PM   PHQ-2 (  [employer])   Q1: Little interest or pleasure in doing things 0   Q2: Feeling down, depressed or hopeless 0   PHQ-2 Score 0           2024   Substance Use   Alcohol more than 3/day or more than 7/wk YES   Do you use any other substances recreationally? (!) DECLINE        Social History     Tobacco Use    Smoking status: Former     Current packs/day: 0.00     Types: Cigarettes     Quit date: 2012     Years since quittin.1   Substance Use Topics    Alcohol use: Yes    Drug use: No             2024   One time HIV Screening   Previous HIV test? Decline          2024   STI Screening   New sexual partner(s) since last STI/HIV test? (!) DECLINE      ASCVD Risk   The 10-year ASCVD risk score (Mani LOPEZ, et al., 2019) is: 11.1%    Values used to calculate the score:      Age: 49 years      Sex: Male      Is Non- : No      Diabetic: No      Tobacco smoker: No       "Systolic Blood Pressure: 171 mmHg      Is BP treated: Yes      HDL Cholesterol: 37 mg/dl      Total Cholesterol: 230 mg/dl            7/16/2024   Contraception/Family Planning   Questions about contraception or family planning (!) DECLINE           Reviewed and updated as needed this visit by Provider                    Lab work is in process      Review of Systems  Constitutional, HEENT, cardiovascular, pulmonary, GI, , musculoskeletal, neuro, skin, endocrine and psych systems are negative, except as otherwise noted.     Objective    Exam  BP (!) 171/111   Pulse 78   Ht 1.854 m (6' 1\")   Wt 94 kg (207 lb 3.2 oz)   SpO2 98%   BMI 27.34 kg/m     Estimated body mass index is 27.34 kg/m  as calculated from the following:    Height as of this encounter: 1.854 m (6' 1\").    Weight as of this encounter: 94 kg (207 lb 3.2 oz).    Physical Exam  GENERAL: alert and no distress  EYES: Eyes grossly normal to inspection, PERRL and conjunctivae and sclerae normal  HENT: ear canals and TM's normal, nose and mouth without ulcers or lesions  NECK: no adenopathy, no asymmetry, masses, or scars  RESP: lungs clear to auscultation - no rales, rhonchi or wheezes  CV: regular rate and rhythm, normal S1 S2, no S3 or S4, no murmur, click or rub, no peripheral edema  ABDOMEN: soft, nontender, no hepatosplenomegaly, no masses and bowel sounds normal  MS: no gross musculoskeletal defects noted, no edema  SKIN: no suspicious lesions or rashes  NEURO: Normal strength and tone, mentation intact and speech normal  PSYCH: mentation appears normal, affect normal/bright        Signed Electronically by: Jose Salgado MD    "

## 2024-07-16 NOTE — PATIENT INSTRUCTIONS
Losartan: increase to 100 mg daily and check at home daily    Omeprazole: increase to 20 mg twice daily for one month.  If not improved will refer to GI    EXERCISE    Will call with xray results      Patient Education   Preventive Care Advice   This is general advice given by our system to help you stay healthy. However, your care team may have specific advice just for you. Please talk to your care team about your preventive care needs.  Nutrition  Eat 5 or more servings of fruits and vegetables each day.  Try wheat bread, brown rice and whole grain pasta (instead of white bread, rice, and pasta).  Get enough calcium and vitamin D. Check the label on foods and aim for 100% of the RDA (recommended daily allowance).  Lifestyle  Exercise at least 150 minutes each week  (30 minutes a day, 5 days a week).  Do muscle strengthening activities 2 days a week. These help control your weight and prevent disease.  No smoking.  Wear sunscreen to prevent skin cancer.  Have a dental exam and cleaning every 6 months.  Yearly exams  See your health care team every year to talk about:  Any changes in your health.  Any medicines your care team has prescribed.  Preventive care, family planning, and ways to prevent chronic diseases.  Shots (vaccines)   HPV shots (up to age 26), if you've never had them before.  Hepatitis B shots (up to age 59), if you've never had them before.  COVID-19 shot: Get this shot when it's due.  Flu shot: Get a flu shot every year.  Tetanus shot: Get a tetanus shot every 10 years.  Pneumococcal, hepatitis A, and RSV shots: Ask your care team if you need these based on your risk.  Shingles shot (for age 50 and up)  General health tests  Diabetes screening:  Starting at age 35, Get screened for diabetes at least every 3 years.  If you are younger than age 35, ask your care team if you should be screened for diabetes.  Cholesterol test: At age 39, start having a cholesterol test every 5 years, or more often if  advised.  Bone density scan (DEXA): At age 50, ask your care team if you should have this scan for osteoporosis (brittle bones).  Hepatitis C: Get tested at least once in your life.  STIs (sexually transmitted infections)  Before age 24: Ask your care team if you should be screened for STIs.  After age 24: Get screened for STIs if you're at risk. You are at risk for STIs (including HIV) if:  You are sexually active with more than one person.  You don't use condoms every time.  You or a partner was diagnosed with a sexually transmitted infection.  If you are at risk for HIV, ask about PrEP medicine to prevent HIV.  Get tested for HIV at least once in your life, whether you are at risk for HIV or not.  Cancer screening tests  Cervical cancer screening: If you have a cervix, begin getting regular cervical cancer screening tests starting at age 21.  Breast cancer scan (mammogram): If you've ever had breasts, begin having regular mammograms starting at age 40. This is a scan to check for breast cancer.  Colon cancer screening: It is important to start screening for colon cancer at age 45.  Have a colonoscopy test every 10 years (or more often if you're at risk) Or, ask your provider about stool tests like a FIT test every year or Cologuard test every 3 years.  To learn more about your testing options, visit:   .  For help making a decision, visit:   https://bit.ly/gm82503.  Prostate cancer screening test: If you have a prostate, ask your care team if a prostate cancer screening test (PSA) at age 55 is right for you.  Lung cancer screening: If you are a current or former smoker ages 50 to 80, ask your care team if ongoing lung cancer screenings are right for you.  For informational purposes only. Not to replace the advice of your health care provider. Copyright   2023 [employer]. All rights reserved. Clinically reviewed by the [facility]. [employer]DSmartSearch.com [id number] - REV 01/24.

## 2024-07-17 LAB
APO A-I SERPL-MCNC: 7 MG/DL
HCV AB SERPL QL IA: NONREACTIVE

## 2024-07-18 LAB
TTG IGA SER-ACNC: 0.4 U/ML
TTG IGG SER-ACNC: <0.6 U/ML

## 2024-07-19 ENCOUNTER — TELEPHONE (OUTPATIENT)
Dept: FAMILY MEDICINE | Facility: CLINIC | Age: 49
End: 2024-07-19

## 2024-07-19 DIAGNOSIS — M25.551 HIP PAIN, RIGHT: Primary | ICD-10-CM

## 2024-07-19 NOTE — TELEPHONE ENCOUNTER
Jose Salgado MD  7/17/2024 12:04 PM CDT Back to Top    Please call patient with results:     Xray shows mild arthritis.  This is unlikely to cause significant symptoms.  Would recommend he try PT and follow up if not improved.  Please place referral to his preferred PT location ([facility] if no preference).  Thank you     Jose Salgado MD

## 2024-08-06 ENCOUNTER — TRANSFERRED RECORDS (OUTPATIENT)
Dept: FAMILY MEDICINE | Facility: CLINIC | Age: 49
End: 2024-08-06

## 2024-11-07 DIAGNOSIS — I10 PRIMARY HYPERTENSION: ICD-10-CM

## 2024-11-07 NOTE — TELEPHONE ENCOUNTER
Med: Losartan    LOV (related): 7/16/24    Last Lab: 3/1/23      Due for F/U around: 8/13/24 Overdue    Next Appt: 11/19/24 med check scheduled        BP Readings from Last 3 Encounters:   07/16/24 (!) 145/97   03/01/23 131/86   02/14/23 (!) 138/90       Last Comprehensive Metabolic Panel:  Lab Results   Component Value Date     03/01/2023    POTASSIUM 4.7 03/01/2023    CHLORIDE 103 03/01/2023    CO2 29 01/22/2021    ANIONGAP 4 01/22/2021    GLC 97 03/01/2023    BUN 14 03/01/2023    BUN 19 03/01/2023    CR 0.73 (L) 03/01/2023    GFRESTIMATED >90 01/22/2021    CINDY 10.0 03/01/2023

## 2024-11-08 RX ORDER — LOSARTAN POTASSIUM 100 MG/1
100 TABLET ORAL DAILY
Qty: 90 TABLET | Refills: 0 | Status: SHIPPED | OUTPATIENT
Start: 2024-11-08

## 2024-11-19 ENCOUNTER — OFFICE VISIT (OUTPATIENT)
Dept: FAMILY MEDICINE | Facility: CLINIC | Age: 49
End: 2024-11-19

## 2024-11-19 VITALS
SYSTOLIC BLOOD PRESSURE: 115 MMHG | WEIGHT: 207 LBS | BODY MASS INDEX: 27.31 KG/M2 | HEART RATE: 90 BPM | DIASTOLIC BLOOD PRESSURE: 83 MMHG | OXYGEN SATURATION: 96 %

## 2024-11-19 DIAGNOSIS — Z23 IMMUNIZATION DUE: ICD-10-CM

## 2024-11-19 DIAGNOSIS — I10 PRIMARY HYPERTENSION: ICD-10-CM

## 2024-11-19 DIAGNOSIS — R10.13 DYSPEPSIA: ICD-10-CM

## 2024-11-19 DIAGNOSIS — K21.9 GASTROESOPHAGEAL REFLUX DISEASE, UNSPECIFIED WHETHER ESOPHAGITIS PRESENT: Primary | ICD-10-CM

## 2024-11-19 LAB
ANION GAP SERPL CALCULATED.3IONS-SCNC: 8 MMOL/L (ref 7–15)
BUN SERPL-MCNC: 13.9 MG/DL (ref 6–20)
CALCIUM SERPL-MCNC: 9.6 MG/DL (ref 8.8–10.4)
CHLORIDE SERPL-SCNC: 103 MMOL/L (ref 98–107)
CREAT SERPL-MCNC: 0.85 MG/DL (ref 0.67–1.17)
EGFRCR SERPLBLD CKD-EPI 2021: >90 ML/MIN/1.73M2
FASTING STATUS PATIENT QL REPORTED: NO
GLUCOSE SERPL-MCNC: 105 MG/DL (ref 70–99)
HCO3 SERPL-SCNC: 28 MMOL/L (ref 22–29)
POTASSIUM SERPL-SCNC: 4.3 MMOL/L (ref 3.4–5.3)
SODIUM SERPL-SCNC: 139 MMOL/L (ref 135–145)

## 2024-11-19 PROCEDURE — 82310 ASSAY OF CALCIUM: CPT | Performed by: FAMILY MEDICINE

## 2024-11-19 PROCEDURE — 90750 HZV VACC RECOMBINANT IM: CPT | Performed by: FAMILY MEDICINE

## 2024-11-19 PROCEDURE — 82565 ASSAY OF CREATININE: CPT | Performed by: FAMILY MEDICINE

## 2024-11-19 PROCEDURE — 80048 BASIC METABOLIC PNL TOTAL CA: CPT | Performed by: FAMILY MEDICINE

## 2024-11-19 PROCEDURE — 90661 CCIIV3 VAC ABX FR 0.5 ML IM: CPT | Performed by: FAMILY MEDICINE

## 2024-11-19 PROCEDURE — 36415 COLL VENOUS BLD VENIPUNCTURE: CPT | Performed by: FAMILY MEDICINE

## 2024-11-19 PROCEDURE — 99214 OFFICE O/P EST MOD 30 MIN: CPT | Mod: 25 | Performed by: FAMILY MEDICINE

## 2024-11-19 PROCEDURE — 90471 IMMUNIZATION ADMIN: CPT | Performed by: FAMILY MEDICINE

## 2024-11-19 PROCEDURE — 90472 IMMUNIZATION ADMIN EACH ADD: CPT | Performed by: FAMILY MEDICINE

## 2024-11-19 PROCEDURE — 80048 BASIC METABOLIC PNL TOTAL CA: CPT | Mod: 90 | Performed by: FAMILY MEDICINE

## 2024-11-19 NOTE — PROGRESS NOTES
Subjective     Darci is a 49 year old patient who presents to clinic for follow up.    GERD/dyspepsia: did not improve with doubling PPI.  Still with epigastric discomfort.  No melena.  Did not complete h pylori or fecal calprotectin.      HTN: doing well on higher dose losartan      Review of Systems   Constitutional, HEENT, cardiovascular, pulmonary, GI, , musculoskeletal, neuro, skin, endocrine and psych systems are negative, except as otherwise noted.      Objective    /83   Pulse 90   Wt 93.9 kg (207 lb)   SpO2 96%   BMI 27.31 kg/m      General: Well appearing, NAD  Psych: normal mood and affect        No results found for this or any previous visit (from the past 24 hours).    Gastroesophageal reflux disease, unspecified whether esophagitis present  Recommend referral to consider endoscopy  Complete open lab orders  - Adult GI  Referral - Consult Only - To a [facility] Location (Use POS/Location); Future    Immunization due    - VACCINE ADMINISTRATION, INITIAL  - IMMUNIATION ADMIN EACH ADDT'    Dyspepsia  See above  - Adult GI  Referral - Consult Only - To a [facility] Location (Use POS/Location); Future    Primary hypertension  stable/controlled. Cont current medication(s) and treatment    - Basic metabolic panel; Future  - VENOUS COLLECTION  - Basic metabolic panel    Follow up after GI consultation

## 2025-02-26 DIAGNOSIS — I10 PRIMARY HYPERTENSION: ICD-10-CM

## 2025-02-26 RX ORDER — LOSARTAN POTASSIUM 100 MG/1
100 TABLET ORAL DAILY
Qty: 90 TABLET | Refills: 0 | Status: SHIPPED | OUTPATIENT
Start: 2025-02-26

## 2025-02-26 NOTE — TELEPHONE ENCOUNTER
Med: losartan (COZAAR) 100 MG tablet     LOV (related): 11/19/24    Last Lab: 11/19/24      Due for F/U around: Follow up after GI consultation     Next Appt: NONE        BP Readings from Last 3 Encounters:   11/19/24 115/83   07/16/24 (!) 145/97   03/01/23 131/86       Last Comprehensive Metabolic Panel:  Lab Results   Component Value Date     11/19/2024    POTASSIUM 4.3 11/19/2024    CHLORIDE 103 11/19/2024    CO2 28 11/19/2024    ANIONGAP 8 11/19/2024     (H) 11/19/2024    BUN 13.9 11/19/2024    CR 0.85 11/19/2024    GFRESTIMATED >90 11/19/2024    CINDY 9.6 11/19/2024

## 2025-03-05 ENCOUNTER — OFFICE VISIT (OUTPATIENT)
Dept: FAMILY MEDICINE | Facility: CLINIC | Age: 50
End: 2025-03-05

## 2025-03-05 VITALS
DIASTOLIC BLOOD PRESSURE: 87 MMHG | OXYGEN SATURATION: 96 % | HEART RATE: 83 BPM | WEIGHT: 213.2 LBS | SYSTOLIC BLOOD PRESSURE: 105 MMHG | BODY MASS INDEX: 28.13 KG/M2

## 2025-03-05 DIAGNOSIS — I10 PRIMARY HYPERTENSION: ICD-10-CM

## 2025-03-05 DIAGNOSIS — L02.92 FURUNCLE OF SKIN OR SUBCUTANEOUS TISSUE: Primary | ICD-10-CM

## 2025-03-05 DIAGNOSIS — K21.9 GASTROESOPHAGEAL REFLUX DISEASE, UNSPECIFIED WHETHER ESOPHAGITIS PRESENT: ICD-10-CM

## 2025-03-05 PROCEDURE — 3079F DIAST BP 80-89 MM HG: CPT | Performed by: FAMILY MEDICINE

## 2025-03-05 PROCEDURE — G2211 COMPLEX E/M VISIT ADD ON: HCPCS | Performed by: FAMILY MEDICINE

## 2025-03-05 PROCEDURE — 99214 OFFICE O/P EST MOD 30 MIN: CPT | Performed by: FAMILY MEDICINE

## 2025-03-05 PROCEDURE — 3074F SYST BP LT 130 MM HG: CPT | Performed by: FAMILY MEDICINE

## 2025-03-05 RX ORDER — SULFAMETHOXAZOLE AND TRIMETHOPRIM 800; 160 MG/1; MG/1
1 TABLET ORAL 2 TIMES DAILY
Qty: 14 TABLET | Refills: 0 | Status: SHIPPED | OUTPATIENT
Start: 2025-03-05 | End: 2025-03-12

## 2025-03-05 RX ORDER — OMEPRAZOLE 20 MG/1
20 CAPSULE, DELAYED RELEASE ORAL 2 TIMES DAILY
COMMUNITY
Start: 2025-03-05

## 2025-03-05 NOTE — PROGRESS NOTES
Answers submitted by the patient for this visit:  General Questionnaire (Submitted on 3/5/2025)  Chief Complaint: Chronic problems general questions HPI Form  What is the reason for your visit today? : skin condition  How many days per week do you miss taking your medication?: 0  Questionnaire about: Chronic problems general questions HPI Form (Submitted on 3/5/2025)  Chief Complaint: Chronic problems general questions HPI Form      Subjective     Darci is a 49 year old patient who presents to clinic for evaluation.  Reports small lump left lower abdomen that is mildly tender for 3 days.  Recently he had a similar lump in left groin that he drained himself and mostly resolved. This one has not had any drainage.  No fever or chills.    GERD/Dyspepsia: not improved.  Was on PPI BID and referred to GI but did not schedule.  Still has symptoms.  No melena.    HTN: doing well on losartan.    Review of Systems   Constitutional, HEENT, cardiovascular, pulmonary, GI, , musculoskeletal, neuro, skin, endocrine and psych systems are negative, except as otherwise noted.      Objective    /87 (BP Location: Right arm)   Pulse 83   Wt 96.7 kg (213 lb 3.2 oz)   SpO2 96%   BMI 28.13 kg/m      General: Well appearing, NAD  Skin: there is a small area of induration in the left lower abdomen with mild surrounding erythema.  No fluctuance.  Psych: normal mood and affect          Furuncle of skin or subcutaneous tissue  Not definitely drainable at this time.  Discussed options.  Elects trial of antibiotics.  If not improved or worsens will return for I&D  - sulfamethoxazole-trimethoprim (BACTRIM DS) 800-160 MG tablet; Take 1 tablet by mouth 2 times daily for 7 days.    Gastroesophageal reflux disease, unspecified whether esophagitis present  Encouraged him to schedule GI consult for possible EGD    Primary hypertension  stable/controlled. Cont current medication(s) and treatment    Patient is agreement with the assessment and  plan as outlined above.  All questions answered.  Red flag symptoms that should prompt emergent evaluation discussed and understood.

## 2025-03-05 NOTE — PATIENT INSTRUCTIONS
Comment: Sol [facility]   [address]   [place] [address]   Phone: [phone]

## 2025-05-26 DIAGNOSIS — I10 PRIMARY HYPERTENSION: ICD-10-CM

## 2025-05-27 RX ORDER — LOSARTAN POTASSIUM 100 MG/1
100 TABLET ORAL DAILY
Qty: 90 TABLET | Refills: 0 | Status: SHIPPED | OUTPATIENT
Start: 2025-05-27

## 2025-05-27 NOTE — TELEPHONE ENCOUNTER
Med: Losartan     LOV (related): 3/5/25 with Dr. Salgado     Last Lab: 11/19/24      Due for F/U around: 7/2025 due for CPX    Next Appt: None        BP Readings from Last 3 Encounters:   03/21/25 133/89   03/05/25 105/87   11/19/24 115/83       Last Comprehensive Metabolic Panel:  Lab Results   Component Value Date     11/19/2024    POTASSIUM 4.3 11/19/2024    CHLORIDE 103 11/19/2024    CO2 28 11/19/2024    ANIONGAP 8 11/19/2024     (H) 11/19/2024    BUN 13.9 11/19/2024    CR 0.85 11/19/2024    GFRESTIMATED >90 11/19/2024    CINDY 9.6 11/19/2024

## 2025-08-23 DIAGNOSIS — I10 PRIMARY HYPERTENSION: ICD-10-CM

## 2025-08-23 RX ORDER — LOSARTAN POTASSIUM 100 MG/1
100 TABLET ORAL DAILY
Qty: 90 TABLET | Refills: 0 | Status: SHIPPED | OUTPATIENT
Start: 2025-08-23

## 2025-08-24 ENCOUNTER — HEALTH MAINTENANCE LETTER (OUTPATIENT)
Age: 50
End: 2025-08-24